# Patient Record
Sex: MALE | URBAN - METROPOLITAN AREA
[De-identification: names, ages, dates, MRNs, and addresses within clinical notes are randomized per-mention and may not be internally consistent; named-entity substitution may affect disease eponyms.]

---

## 2020-10-01 ENCOUNTER — NEW PATIENT (OUTPATIENT)
Dept: URBAN - METROPOLITAN AREA CLINIC 85 | Facility: CLINIC | Age: 75
End: 2020-10-01
Payer: MEDICARE

## 2020-10-01 PROCEDURE — 92002 INTRM OPH EXAM NEW PATIENT: CPT | Performed by: OPHTHALMOLOGY

## 2020-10-01 RX ORDER — CIPROFLOXACIN HYDROCHLORIDE 3.5 MG/ML
0.3 % SOLUTION/ DROPS TOPICAL
Qty: 1 | Refills: 1 | Status: INACTIVE
Start: 2020-10-01 | End: 2020-10-02

## 2020-10-01 RX ORDER — PREDNISOLONE ACETATE 10 MG/ML
1 % SUSPENSION/ DROPS OPHTHALMIC
Qty: 1 | Refills: 1 | Status: INACTIVE
Start: 2020-10-01 | End: 2020-10-09

## 2020-10-01 ASSESSMENT — INTRAOCULAR PRESSURE
OD: 16
OS: 14

## 2020-10-09 ENCOUNTER — FOLLOW UP ESTABLISHED (OUTPATIENT)
Dept: URBAN - METROPOLITAN AREA CLINIC 85 | Facility: CLINIC | Age: 75
End: 2020-10-09
Payer: MEDICARE

## 2020-10-09 DIAGNOSIS — B02.39 OTHER HERPES ZOSTER EYE DISEASE: Primary | ICD-10-CM

## 2020-10-09 PROCEDURE — 92012 INTRM OPH EXAM EST PATIENT: CPT | Performed by: OPHTHALMOLOGY

## 2020-10-09 RX ORDER — PREDNISOLONE ACETATE 10 MG/ML
1 % SUSPENSION/ DROPS OPHTHALMIC
Qty: 1 | Refills: 1 | Status: INACTIVE
Start: 2020-10-09 | End: 2020-10-30

## 2020-10-09 RX ORDER — HYDROCODONE BITARTRATE AND ACETAMINOPHEN 10; 325 MG/1; MG/1
TABLET ORAL
Qty: 0 | Refills: 0 | Status: INACTIVE
Start: 2020-10-09 | End: 2020-10-30

## 2020-10-09 ASSESSMENT — INTRAOCULAR PRESSURE
OS: 15
OD: 15

## 2020-10-30 ENCOUNTER — FOLLOW UP ESTABLISHED (OUTPATIENT)
Dept: URBAN - METROPOLITAN AREA CLINIC 85 | Facility: CLINIC | Age: 75
End: 2020-10-30
Payer: MEDICARE

## 2020-10-30 PROCEDURE — 92012 INTRM OPH EXAM EST PATIENT: CPT | Performed by: OPHTHALMOLOGY

## 2020-10-30 RX ORDER — PREDNISOLONE ACETATE 10 MG/ML
1 % SUSPENSION/ DROPS OPHTHALMIC
Qty: 1 | Refills: 1 | Status: INACTIVE
Start: 2020-10-30 | End: 2020-12-04

## 2020-10-30 ASSESSMENT — INTRAOCULAR PRESSURE
OS: 15
OD: 14

## 2020-12-04 ENCOUNTER — FOLLOW UP ESTABLISHED (OUTPATIENT)
Dept: URBAN - METROPOLITAN AREA CLINIC 85 | Facility: CLINIC | Age: 75
End: 2020-12-04
Payer: MEDICARE

## 2020-12-04 PROCEDURE — 92012 INTRM OPH EXAM EST PATIENT: CPT | Performed by: OPHTHALMOLOGY

## 2020-12-04 ASSESSMENT — INTRAOCULAR PRESSURE
OS: 13
OD: 14

## 2021-04-15 ENCOUNTER — OFFICE VISIT (OUTPATIENT)
Dept: URBAN - METROPOLITAN AREA CLINIC 85 | Facility: CLINIC | Age: 76
End: 2021-04-15
Payer: MEDICARE

## 2021-04-15 DIAGNOSIS — H04.561 STENOSIS OF RIGHT LACRIMAL PUNCTUM: Primary | ICD-10-CM

## 2021-04-15 PROCEDURE — 92012 INTRM OPH EXAM EST PATIENT: CPT | Performed by: OPHTHALMOLOGY

## 2021-04-15 ASSESSMENT — INTRAOCULAR PRESSURE
OD: 13
OS: 13

## 2021-04-15 NOTE — IMPRESSION/PLAN
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 Plan: Discussed diagnosis and potential of surgical intervention with  in hopes of improving symptoms. RTC for soon for 2 snip OD.

## 2021-04-20 ENCOUNTER — PROCEDURE (OUTPATIENT)
Dept: URBAN - METROPOLITAN AREA CLINIC 85 | Facility: CLINIC | Age: 76
End: 2021-04-20
Payer: MEDICARE

## 2021-04-20 PROCEDURE — 68440 SNIP INC LACRIMAL PUNCTUM: CPT | Performed by: OPHTHALMOLOGY

## 2021-04-20 PROCEDURE — 68801 DILATE TEAR DUCT OPENING: CPT | Performed by: OPHTHALMOLOGY

## 2021-04-30 ENCOUNTER — OFFICE VISIT (OUTPATIENT)
Dept: URBAN - METROPOLITAN AREA CLINIC 85 | Facility: CLINIC | Age: 76
End: 2021-04-30
Payer: MEDICARE

## 2021-04-30 DIAGNOSIS — H25.813 COMBINED FORMS OF AGE-RELATED CATARACT, BILATERAL: ICD-10-CM

## 2021-04-30 PROCEDURE — 92014 COMPRE OPH EXAM EST PT 1/>: CPT | Performed by: OPHTHALMOLOGY

## 2021-04-30 ASSESSMENT — INTRAOCULAR PRESSURE
OS: 13
OD: 13

## 2021-04-30 NOTE — IMPRESSION/PLAN
Impression: Dry eye syndrome of bilateral lacrimal glands: H04.123. Plan: Discussed dry eye diagnosis in detail. Recommend Systane Complete QID OU.

## 2021-04-30 NOTE — IMPRESSION/PLAN
Impression: Combined forms of age-related cataract, bilateral: H25.813. Plan: Discussed cataract findings. Discussed option of ce/iol OU. Discussed risks, potential benefits, potential complications, and alternatives to surgery. Patient desires to have surgery. Recommend CE w/IOL consult with  or Dr. Kadi Coley.

## 2021-05-11 ENCOUNTER — OFFICE VISIT (OUTPATIENT)
Dept: URBAN - METROPOLITAN AREA CLINIC 85 | Facility: CLINIC | Age: 76
End: 2021-05-11
Payer: MEDICARE

## 2021-05-11 DIAGNOSIS — H25.812 COMBINED FORMS OF AGE-RELATED CATARACT, LEFT EYE: ICD-10-CM

## 2021-05-11 PROCEDURE — 92014 COMPRE OPH EXAM EST PT 1/>: CPT | Performed by: STUDENT IN AN ORGANIZED HEALTH CARE EDUCATION/TRAINING PROGRAM

## 2021-05-11 PROCEDURE — 92134 CPTRZ OPH DX IMG PST SGM RTA: CPT | Performed by: STUDENT IN AN ORGANIZED HEALTH CARE EDUCATION/TRAINING PROGRAM

## 2021-05-11 ASSESSMENT — INTRAOCULAR PRESSURE
OD: 12
OS: 13

## 2021-05-11 ASSESSMENT — VISUAL ACUITY
OS: 20/50
OD: 20/80

## 2021-05-11 ASSESSMENT — KERATOMETRY
OS: 44.38
OD: 45.63

## 2021-05-11 NOTE — IMPRESSION/PLAN
Impression: Combined forms of age-related cataract, bilateral: H25.813. Plan: Discussed cataracts, treatment options, and surgical risks/benefits with patient including bleeding, infection, capsular break, glaucoma, corneal clouding. Pt understands changing glasses will not improve vision/glare. Discussed Premium options available. Pt understands the need for glasses after surgery if advanced technology lenses are not chosen and that there is NO PERFECT IOL. Pt understands that even the highest ATLs have limitations including but not limited to Halos/Glare/Difficulty in Dim Lighting and Intermediate BCVA may need glasses after SX. Patient elects surgical treatment. Recommend ORA. Recommend Dexycu + Moxifloxacin (PF) Intracameral Injection. Lens Recommendation: 
Technology: OK for ORA and LENSX Aim OD: -0.25. Aim OS: -0.25. First Eye:  OD first then OS Notes:

## 2021-05-25 ENCOUNTER — ADULT PHYSICAL (OUTPATIENT)
Dept: URBAN - METROPOLITAN AREA CLINIC 85 | Facility: CLINIC | Age: 76
End: 2021-05-25
Payer: MEDICARE

## 2021-05-25 DIAGNOSIS — Z01.818 ENCOUNTER FOR OTHER PREPROCEDURAL EXAMINATION: Primary | ICD-10-CM

## 2021-05-25 PROCEDURE — 99203 OFFICE O/P NEW LOW 30 MIN: CPT | Performed by: NURSE PRACTITIONER

## 2021-05-25 RX ORDER — ASPIRIN 81 MG/1
81 MG TABLET, CHEWABLE ORAL
Qty: 0 | Refills: 0 | Status: ACTIVE
Start: 2021-05-25

## 2021-06-07 ENCOUNTER — SURGERY (OUTPATIENT)
Dept: URBAN - METROPOLITAN AREA SURGERY 55 | Facility: SURGERY | Age: 76
End: 2021-06-07
Payer: MEDICARE

## 2021-06-07 PROCEDURE — 66984 XCAPSL CTRC RMVL W/O ECP: CPT | Performed by: STUDENT IN AN ORGANIZED HEALTH CARE EDUCATION/TRAINING PROGRAM

## 2021-06-08 ENCOUNTER — POST-OPERATIVE VISIT (OUTPATIENT)
Dept: URBAN - METROPOLITAN AREA CLINIC 85 | Facility: CLINIC | Age: 76
End: 2021-06-08
Payer: MEDICARE

## 2021-06-08 PROCEDURE — 99024 POSTOP FOLLOW-UP VISIT: CPT | Performed by: OPTOMETRIST

## 2021-06-08 ASSESSMENT — INTRAOCULAR PRESSURE: OD: 13

## 2021-06-08 NOTE — IMPRESSION/PLAN
Impression: S/P Cataract Extraction by phacoemulsification with IOL placement; ORA OD - 1 Day. Encounter for surgical aftercare following surgery on a sense organ  Z48.810. Plan: The patient was instructed to contact their eye care provider ASAP if there should be any decrease in vision, pain, or any worsening of condition.

## 2021-06-14 ENCOUNTER — POST-OPERATIVE VISIT (OUTPATIENT)
Dept: URBAN - METROPOLITAN AREA CLINIC 85 | Facility: CLINIC | Age: 76
End: 2021-06-14
Payer: MEDICARE

## 2021-06-14 DIAGNOSIS — Z48.810 ENCOUNTER FOR SURGICAL AFTERCARE FOLLOWING SURGERY ON A SENSE ORGAN: Primary | ICD-10-CM

## 2021-06-14 PROCEDURE — 99024 POSTOP FOLLOW-UP VISIT: CPT | Performed by: OPTOMETRIST

## 2021-06-14 ASSESSMENT — INTRAOCULAR PRESSURE
OD: 12
OS: 12

## 2021-06-14 ASSESSMENT — VISUAL ACUITY
OS: 20/20
OD: 20/20

## 2021-06-21 ENCOUNTER — SURGERY (OUTPATIENT)
Dept: URBAN - METROPOLITAN AREA SURGERY 55 | Facility: SURGERY | Age: 76
End: 2021-06-21
Payer: MEDICARE

## 2021-06-21 PROCEDURE — 66984 XCAPSL CTRC RMVL W/O ECP: CPT | Performed by: STUDENT IN AN ORGANIZED HEALTH CARE EDUCATION/TRAINING PROGRAM

## 2021-06-22 ENCOUNTER — POST-OPERATIVE VISIT (OUTPATIENT)
Dept: URBAN - METROPOLITAN AREA CLINIC 85 | Facility: CLINIC | Age: 76
End: 2021-06-22
Payer: MEDICARE

## 2021-06-22 PROCEDURE — 99024 POSTOP FOLLOW-UP VISIT: CPT | Performed by: OPTOMETRIST

## 2021-06-22 ASSESSMENT — INTRAOCULAR PRESSURE: OS: 19

## 2021-06-22 NOTE — IMPRESSION/PLAN
Impression: S/P Cataract Extraction by phacoemulsification with IOL placement; ORA OS - 1 Day. Presence of intraocular lens  Z96.1. Post operative instructions reviewed - Plan: RTC as scheduled --Advised patient to use artificial tears for comfort.

## 2021-07-13 ENCOUNTER — POST-OPERATIVE VISIT (OUTPATIENT)
Dept: URBAN - METROPOLITAN AREA CLINIC 85 | Facility: CLINIC | Age: 76
End: 2021-07-13
Payer: MEDICARE

## 2021-07-13 DIAGNOSIS — Z96.1 PRESENCE OF INTRAOCULAR LENS: Primary | ICD-10-CM

## 2021-07-13 PROCEDURE — 99024 POSTOP FOLLOW-UP VISIT: CPT | Performed by: OPTOMETRIST

## 2021-07-13 ASSESSMENT — INTRAOCULAR PRESSURE
OS: 15
OD: 13

## 2021-07-13 ASSESSMENT — VISUAL ACUITY
OS: 20/20
OD: 20/20

## 2021-07-13 NOTE — IMPRESSION/PLAN
Impression: S/P Cataract Extraction by phacoemulsification with IOL placement; ORA OS - 22 Days. Presence of intraocular lens  Z96.1. Plan: Return 6 months CEE  The patient was instructed to contact their eye care provider ASAP if there should be any decrease in vision, pain, or any worsening of condition.

## 2021-08-26 ENCOUNTER — OFFICE VISIT (OUTPATIENT)
Dept: URBAN - METROPOLITAN AREA CLINIC 85 | Facility: CLINIC | Age: 76
End: 2021-08-26
Payer: MEDICARE

## 2021-08-26 DIAGNOSIS — H00.025 HORDEOLUM INTERNUM LEFT LOWER LID: ICD-10-CM

## 2021-08-26 DIAGNOSIS — H00.024 HORDEOLUM INTERNUM LEFT UPPER LID: ICD-10-CM

## 2021-08-26 DIAGNOSIS — H00.021 HORDEOLUM INTERNUM RIGHT UPPER LID: ICD-10-CM

## 2021-08-26 DIAGNOSIS — H00.022 HORDEOLUM INTERNUM RIGHT LOWER LID: ICD-10-CM

## 2021-08-26 PROCEDURE — 92012 INTRM OPH EXAM EST PATIENT: CPT | Performed by: OPTOMETRIST

## 2021-08-26 RX ORDER — CYCLOSPORINE 0.5 MG/ML
0.05 % EMULSION OPHTHALMIC
Qty: 60 | Refills: 3 | Status: INACTIVE
Start: 2021-08-26 | End: 2021-11-10

## 2021-08-26 ASSESSMENT — INTRAOCULAR PRESSURE
OD: 13
OS: 13

## 2021-08-26 NOTE — IMPRESSION/PLAN
Impression: Hordeolum internum right upper lid: H00.021. Plan: Discussed Ocusoft lid scrubs BID, warm compresses of 10 minute duration followed by lid massage for 1 minute BID, Systane Balance QID OU, and Omega 3 supplements (If OK with family medical doctor). Steph Poe

## 2021-08-26 NOTE — IMPRESSION/PLAN
Impression: Dry eye syndrome of bilateral lacrimal glands: H04.123. Plan: Discussed dry eye diagnosis in detail. Recommend Systane Complete QID OU. Discussed prescription medication options such as Restasis and Lavaun Muckle in the future. Also discussed potential of punctal plugs/punctal cautery. Initiate Restasis OU BID. RTC 2-3 months for dry  eye follow up.

## 2021-10-13 ENCOUNTER — OFFICE VISIT (OUTPATIENT)
Dept: URBAN - METROPOLITAN AREA CLINIC 85 | Facility: CLINIC | Age: 76
End: 2021-10-13
Payer: MEDICARE

## 2021-10-13 DIAGNOSIS — H04.123 DRY EYE SYNDROME OF BILATERAL LACRIMAL GLANDS: Primary | ICD-10-CM

## 2021-10-13 PROCEDURE — 92012 INTRM OPH EXAM EST PATIENT: CPT | Performed by: OPTOMETRIST

## 2021-10-13 ASSESSMENT — INTRAOCULAR PRESSURE
OS: 14
OD: 14

## 2021-10-13 NOTE — IMPRESSION/PLAN
Impression: Dry eye syndrome of bilateral lacrimal glands: H04.123. Plan: Discussed dry eye diagnosis in detail. Recommend Systane Complete QID OU. Discussed prescription medication options such as Restasis and Bud Pulling in the future. Also discussed potential of punctal plugs/punctal cautery. Continue Restasis OU BID. RTC 6 months months for CEE. RTC ASAP should they experience a decrease in vision, pain, or any worsening of condition.

## 2021-11-10 ENCOUNTER — OFFICE VISIT (OUTPATIENT)
Dept: URBAN - METROPOLITAN AREA CLINIC 85 | Facility: CLINIC | Age: 76
End: 2021-11-10
Payer: MEDICARE

## 2021-11-10 PROCEDURE — 92012 INTRM OPH EXAM EST PATIENT: CPT | Performed by: OPTOMETRIST

## 2021-11-10 RX ORDER — LIFITEGRAST 50 MG/ML
5 % SOLUTION/ DROPS OPHTHALMIC
Qty: 60 | Refills: 6 | Status: ACTIVE
Start: 2021-11-10

## 2021-11-10 ASSESSMENT — INTRAOCULAR PRESSURE
OD: 14
OS: 14

## 2021-11-10 NOTE — IMPRESSION/PLAN
Impression: Dry eye syndrome of bilateral lacrimal glands: H04.123. Plan: Discussed dry eye diagnosis in detail. Recommend Systane Complete QID OU. D/C  Restasis and initiate Xiidra OU BID  Also discussed potential of punctal plugs/punctal cautery. RTC 2-3 months months for CEE. RTC ASAP should they experience a decrease in vision, pain, or any worsening of condition.

## 2022-12-27 NOTE — IMPRESSION/PLAN
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  Plan:
Detail Level: Detailed
Quality 130: Documentation Of Current Medications In The Medical Record: Current Medications Documented

## 2023-03-30 ENCOUNTER — OFFICE VISIT (OUTPATIENT)
Dept: URBAN - METROPOLITAN AREA CLINIC 85 | Facility: CLINIC | Age: 78
End: 2023-03-30
Payer: MEDICARE

## 2023-03-30 DIAGNOSIS — H04.123 DRY EYE SYNDROME OF BILATERAL LACRIMAL GLANDS: ICD-10-CM

## 2023-03-30 DIAGNOSIS — H43.813 VITREOUS DEGENERATION, BILATERAL: ICD-10-CM

## 2023-03-30 DIAGNOSIS — H04.209 EPIPHORA: Primary | ICD-10-CM

## 2023-03-30 DIAGNOSIS — H02.886 MGD OF LEFT EYE: ICD-10-CM

## 2023-03-30 DIAGNOSIS — H02.883 MGD OF RIGHT EYE: ICD-10-CM

## 2023-03-30 PROCEDURE — 92014 COMPRE OPH EXAM EST PT 1/>: CPT | Performed by: OPTOMETRIST

## 2023-03-30 PROCEDURE — 92134 CPTRZ OPH DX IMG PST SGM RTA: CPT | Performed by: OPTOMETRIST

## 2023-03-30 ASSESSMENT — VISUAL ACUITY
OD: 20/20
OS: 20/20

## 2023-03-30 ASSESSMENT — INTRAOCULAR PRESSURE
OS: 13
OD: 14

## 2023-03-30 NOTE — IMPRESSION/PLAN
Impression: Dry eye syndrome of bilateral lacrimal glands: H04.123. Plan: Discussed dry eye diagnosis in detail. Continue  Systane Complete QID OU. Discussed prescription medication options such as Restasis and [place] in the future. Also discussed potential of punctal plugs/punctal cautery.

## 2023-03-30 NOTE — IMPRESSION/PLAN
Impression: Epiphora: H04.209. Plan: Discussed diagnosis in detail with patient. Discussed possible  treatment options with oculoplastic surgeon. Will continue to observe condition and or symptoms. Call if symptoms or VA worsens. Recommend consult with oculoplastic surgeon if desired. Patient would like to proceed with consult with oculoplastic surgeon.

## 2023-03-30 NOTE — IMPRESSION/PLAN
Impression: MGD of left eye: H02.886. Plan: Discussed Ocusoft lid scrubs BID, warm compresses of 10 minute duration followed by lid massage for 1 minute BID, and Systane Balance QID OU.

## 2023-04-11 ENCOUNTER — OFFICE VISIT (OUTPATIENT)
Dept: URBAN - METROPOLITAN AREA CLINIC 51 | Facility: CLINIC | Age: 78
End: 2023-04-11
Payer: MEDICARE

## 2023-04-11 DIAGNOSIS — H02.135 SENILE ECTROPION OF LT LOWER EYELID: ICD-10-CM

## 2023-04-11 DIAGNOSIS — H04.209 EPIPHORA: Primary | ICD-10-CM

## 2023-04-11 DIAGNOSIS — H02.132 SENILE ECTROPION OF RT LOWER EYELID: ICD-10-CM

## 2023-04-11 PROCEDURE — 92285 EXTERNAL OCULAR PHOTOGRAPHY: CPT | Performed by: OPHTHALMOLOGY

## 2023-04-11 PROCEDURE — 99213 OFFICE O/P EST LOW 20 MIN: CPT | Performed by: OPHTHALMOLOGY

## 2023-04-11 RX ORDER — NEOMYCIN SULFATE, POLYMYXIN B SULFATE AND DEXAMETHASONE 3.5; 10000; 1 MG/G; [USP'U]/G; MG/G
OINTMENT OPHTHALMIC
Qty: 2 | Refills: 2 | Status: ACTIVE
Start: 2023-04-11

## 2023-04-11 NOTE — IMPRESSION/PLAN
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 Plan: The patient demonstrates tarsal ectropion along with midface descent and negative malar vector. This pathology is causing exposure keratoconjunctivitis, FB sensation, tearing, and ocular irritation. To resolve this, I recommended lateral tarsal strip, conjplasty w/ rearrangement, ATR<10cm eyelid to achieve myocutaneous elevation of the midface orbicularis/SOOF to support the lower eyelid position, and prevent hammocking of the lid beneath the globe given negative malar vector, and ectropion repair w/ suture to replace the tarsus in physiologic position, and protect the globe. Risk and benefits discussed with patient and all questions were answered.

## 2023-04-11 NOTE — IMPRESSION/PLAN
Impression: Senile ectropion of lt lower eyelid: H02.135. Plan: The patient demonstrates tarsal ectropion along with midface descent and negative malar vector. This pathology is causing exposure keratoconjunctivitis, FB sensation, tearing, and ocular irritation. To resolve this, I recommended lateral tarsal strip, conjplasty w/ rearrangement, ATR<10cm eyelid to achieve myocutaneous elevation of the midface orbicularis/SOOF to support the lower eyelid position, and prevent hammocking of the lid beneath the globe given negative malar vector, and ectropion repair w/ suture to replace the tarsus in physiologic position, and protect the globe. Risk and benefits discussed with patient and all questions were answered.

## 2023-04-11 NOTE — IMPRESSION/PLAN
Impression: Epiphora: H04.209. Plan: % patent on irrigation. suspect that epiphora is 2/2 ectropion. see below. Irrigated BLL puncta, Right puncta Reflux 0%, NL Outflow 100%, Left puncta Reflux 0%, NL Outflow 100%. See plan.

## 2023-05-23 ENCOUNTER — ADULT PHYSICAL (OUTPATIENT)
Dept: URBAN - METROPOLITAN AREA CLINIC 85 | Facility: CLINIC | Age: 78
End: 2023-05-23
Payer: MEDICARE

## 2023-05-23 DIAGNOSIS — H04.209 UNSPECIFIED EPIPHORA, UNSPECIFIED SIDE: ICD-10-CM

## 2023-05-23 DIAGNOSIS — Z01.818 ENCOUNTER FOR OTHER PREPROCEDURAL EXAMINATION: Primary | ICD-10-CM

## 2023-05-23 PROCEDURE — 99203 OFFICE O/P NEW LOW 30 MIN: CPT | Performed by: PHYSICIAN ASSISTANT

## 2023-05-23 RX ORDER — PREDNISONE 10 MG/1
10 MG TABLET ORAL
Qty: 0 | Refills: 0 | Status: ACTIVE
Start: 2023-05-23

## 2023-06-12 ENCOUNTER — POST-OPERATIVE VISIT (OUTPATIENT)
Dept: URBAN - METROPOLITAN AREA CLINIC 85 | Facility: CLINIC | Age: 78
End: 2023-06-12
Payer: MEDICARE

## 2023-06-12 DIAGNOSIS — Z48.89 ENCOUNTER FOR OTHER SPECIFIED SURGICAL AFTERCARE: Primary | ICD-10-CM

## 2023-06-12 PROCEDURE — 99024 POSTOP FOLLOW-UP VISIT: CPT | Performed by: OPTOMETRIST

## 2023-06-12 ASSESSMENT — INTRAOCULAR PRESSURE
OS: 13
OD: 13

## 2023-06-12 NOTE — IMPRESSION/PLAN
Impression: S/P Adjacent Tissue Rearrangement Less Than 10 Square Centimeters; Conjunctivoplasty with rearrangement; Lateral Tarsal Strip - Both Lower Lids OU - 10 Days. Encounter for other specified surgical aftercare  Z48.89. Plan: RTC ASAP should they experience a decrease in vision, pain, or any worsening of condition. Finish ointment.

## 2023-12-04 ENCOUNTER — OFFICE VISIT (OUTPATIENT)
Dept: URBAN - METROPOLITAN AREA CLINIC 85 | Facility: CLINIC | Age: 78
End: 2023-12-04
Payer: MEDICARE

## 2023-12-04 DIAGNOSIS — H04.209 EPIPHORA: Primary | ICD-10-CM

## 2023-12-04 DIAGNOSIS — H43.813 VITREOUS DEGENERATION, BILATERAL: ICD-10-CM

## 2023-12-04 DIAGNOSIS — H04.123 DRY EYE SYNDROME OF BILATERAL LACRIMAL GLANDS: ICD-10-CM

## 2023-12-04 PROCEDURE — 92014 COMPRE OPH EXAM EST PT 1/>: CPT | Performed by: OPTOMETRIST

## 2023-12-04 ASSESSMENT — VISUAL ACUITY
OS: 20/25
OD: 20/25

## 2023-12-04 ASSESSMENT — INTRAOCULAR PRESSURE
OD: 13
OS: 13

## 2024-12-19 ENCOUNTER — OFFICE VISIT (OUTPATIENT)
Dept: URBAN - METROPOLITAN AREA CLINIC 85 | Facility: CLINIC | Age: 79
End: 2024-12-19
Payer: MEDICARE

## 2024-12-19 DIAGNOSIS — H02.105 ECTROPION OF LEFT LOWER EYELID: ICD-10-CM

## 2024-12-19 DIAGNOSIS — H02.88B MGD OF LEFT EYE, UPPER AND LOWER EYELIDS: ICD-10-CM

## 2024-12-19 DIAGNOSIS — H02.88A MGD OF RIGHT EYE, UPPER AND LOWER EYELIDS: ICD-10-CM

## 2024-12-19 DIAGNOSIS — H02.102 ECTROPION OF RIGHT LOWER EYELID: ICD-10-CM

## 2024-12-19 DIAGNOSIS — H04.123 DRY EYE SYNDROME OF BILATERAL LACRIMAL GLANDS: Primary | ICD-10-CM

## 2024-12-19 DIAGNOSIS — H02.423 MYOGENIC PTOSIS OF BILATERAL EYELIDS: ICD-10-CM

## 2024-12-19 PROCEDURE — 99214 OFFICE O/P EST MOD 30 MIN: CPT | Performed by: OPTOMETRIST

## 2024-12-19 ASSESSMENT — VISUAL ACUITY
OD: 20/25
OS: 20/20

## 2024-12-19 ASSESSMENT — INTRAOCULAR PRESSURE
OD: 13
OS: 13

## 2025-01-09 ENCOUNTER — OFFICE VISIT (OUTPATIENT)
Dept: URBAN - METROPOLITAN AREA CLINIC 85 | Facility: CLINIC | Age: 80
End: 2025-01-09
Payer: MEDICARE

## 2025-01-09 DIAGNOSIS — H02.831 DERMATOCHALASIS OF RIGHT UPPER EYELID: Primary | ICD-10-CM

## 2025-01-09 DIAGNOSIS — H02.834 DERMATOCHALASIS OF LEFT UPPER EYELID: ICD-10-CM

## 2025-01-09 DIAGNOSIS — H02.423 MYOGENIC PTOSIS OF BILATERAL EYELIDS: ICD-10-CM

## 2025-01-09 PROCEDURE — 99213 OFFICE O/P EST LOW 20 MIN: CPT | Performed by: OPHTHALMOLOGY

## 2025-01-09 ASSESSMENT — INTRAOCULAR PRESSURE
OS: 13
OD: 13

## 2025-01-30 ENCOUNTER — OFFICE VISIT (OUTPATIENT)
Dept: URBAN - METROPOLITAN AREA CLINIC 85 | Facility: CLINIC | Age: 80
End: 2025-01-30
Payer: MEDICARE

## 2025-01-30 DIAGNOSIS — H02.834 DERMATOCHALASIS OF LEFT UPPER EYELID: ICD-10-CM

## 2025-01-30 DIAGNOSIS — H02.831 DERMATOCHALASIS OF RIGHT UPPER EYELID: Primary | ICD-10-CM

## 2025-01-30 PROCEDURE — 92285 EXTERNAL OCULAR PHOTOGRAPHY: CPT | Performed by: OPHTHALMOLOGY

## 2025-01-30 PROCEDURE — 99214 OFFICE O/P EST MOD 30 MIN: CPT | Performed by: OPHTHALMOLOGY

## 2025-01-30 PROCEDURE — 92081 LIMITED VISUAL FIELD XM: CPT | Performed by: OPHTHALMOLOGY

## 2025-01-30 RX ORDER — NEOMYCIN SULFATE, POLYMYXIN B SULFATE AND DEXAMETHASONE 1; 3.5; 1 MG/G; MG/G; [USP'U]/G
OINTMENT OPHTHALMIC
Qty: 1 | Refills: 0 | Status: ACTIVE
Start: 2025-01-30

## 2025-01-30 ASSESSMENT — INTRAOCULAR PRESSURE
OD: 11
OS: 11